# Patient Record
Sex: FEMALE | Race: BLACK OR AFRICAN AMERICAN
[De-identification: names, ages, dates, MRNs, and addresses within clinical notes are randomized per-mention and may not be internally consistent; named-entity substitution may affect disease eponyms.]

---

## 2018-01-02 NOTE — RADIOLOGY REPORT (SQ)
EXAM DESCRIPTION:  KUB



COMPLETED DATE/TIME:  1/2/2018 11:32 am



REASON FOR STUDY:  SLOW TRANSIT CONSTIPATION,RIGHT UPPER QUADRANT PAIN K59.01  SLOW TRANSIT CONSTIPAT
ION R10.11  RIGHT UPPER QUADRANT PAIN



COMPARISON:  None.



NUMBER OF VIEWS:  One view.



TECHNIQUE:  Supine radiographic image of the abdomen acquired.



LIMITATIONS:  None.



FINDINGS:  BOWEL GAS PATTERN: Normal bowel gas pattern. No dilated loops.

CONSTIPATION: Moderate.

CALCIFICATIONS: No suspicious calcifications.

SOFT TISSUES: No gross mass or suggestion of organomegaly.

HARDWARE: None in the abdomen.

BONES: No acute fracture. No worrisome bone lesions.

OTHER: No other significant finding.



IMPRESSION:  NO RADIOGRAPHIC EVIDENCE FOR ACUTE ABDOMINAL DISEASE.

Moderate constipation proximal 2/3 of the colon.



TECHNICAL DOCUMENTATION:  JOB ID:  [id number]

 2011 [employer]- All Rights Reserved

## 2018-01-26 NOTE — WOMENS IMAGING REPORT
EXAM DESCRIPTION:  U/S ABDOMEN LIMITED



COMPLETED DATE/TIME:  1/26/2018 7:44 am



REASON FOR STUDY:  ABNORMAL RESULTS OF THE LIVER FUNCTION STUDIES;R94.5 R94.5  ABNORMAL RESULTS OF LI
SHRADDHA FUNCTION STUDIES



COMPARISON:  None.



TECHNIQUE:  Dynamic and static grayscale images acquired of the abdomen and recorded on PACS. Additio
nal selected color Doppler and spectral images recorded.



LIMITATIONS:  None.



FINDINGS:  PANCREAS: No masses.  Visualized pancreatic duct normal caliber.

LIVER: No masses. Echotexture normal.

LIVER VASCULATURE: Normal directional flow of the main portal vein and hepatic veins.

GALLBLADDER: No stones. Normal wall thickness. No pericholecystic fluid.

ULTRASOUND-DETECTED CANTOR'S SIGN: Negative.

INTRAHEPATIC DUCTS AND COMMON DUCT: CBD and intrahepatic ducts normal caliber. No filling defects.

INFERIOR VENA CAVA: Poorly visualized.

AORTA: Poorly visualized.

RIGHT KIDNEY:  Normal size. Normal echogenicity. No solid or suspicious masses. No hydronephrosis. No
 calcifications.

PERITONEAL AND RIGHT PLEURAL SPACE: No ascites or effusions.

OTHER: No other significant findings.



IMPRESSION:  NORMAL RIGHT UPPER QUADRANT ULTRASOUND.  THE ABDOMINAL AORTA AND INFERIOR VENA CAVA ARE 
POORLY VISUALIZED DUE TO OVERLYING BOWEL GAS.



TECHNICAL DOCUMENTATION:  JOB ID:  [id number]

 2011 [employer]- All Rights Reserved

## 2019-07-01 ENCOUNTER — HOSPITAL ENCOUNTER (OUTPATIENT)
Dept: HOSPITAL 62 - OD | Age: 19
End: 2019-07-01
Attending: OTOLARYNGOLOGY
Payer: COMMERCIAL

## 2019-07-01 DIAGNOSIS — J30.9: Primary | ICD-10-CM

## 2019-07-01 PROCEDURE — 36415 COLL VENOUS BLD VENIPUNCTURE: CPT

## 2019-07-01 PROCEDURE — 82785 ASSAY OF IGE: CPT

## 2019-07-01 PROCEDURE — 86003 ALLG SPEC IGE CRUDE XTRC EA: CPT
